# Patient Record
(demographics unavailable — no encounter records)

---

## 2024-10-07 NOTE — HISTORY OF PRESENT ILLNESS
[de-identified] : sore throat sore throat [FreeTextEntry6] : sore throat past 24 hours congested a bit sent home from school

## 2024-10-07 NOTE — PHYSICAL EXAM
[Clear Rhinorrhea] : clear rhinorrhea [Erythematous Oropharynx] : erythematous oropharynx [NL] : warm, clear [de-identified] : mild

## 2024-11-13 NOTE — HISTORY OF PRESENT ILLNESS
[de-identified] : sore throat [FreeTextEntry6] : sore throat  congested w cough chills last night has football game this coming weekend

## 2024-11-13 NOTE — DISCUSSION/SUMMARY
[FreeTextEntry1] : viral Rapid strep is negative. The TC has been sent out to the lab. We will call if overnight throat culture is positive and prescribe antibiotics. Meanwhile, I recommend rest and fluids. fever and pain control as needed. Re eval in office if fever persists more that 4-5 days or for any change or worsening symptoms. mom requested RVP-sent Use humidifier, saline nasal drops, encourage fluids and fever control as needed. Elevate head of bed. Return for spiking fever, worsening symptoms, respiratory distress or concerns.

## 2024-12-06 NOTE — DISCUSSION/SUMMARY
[FreeTextEntry1] : 10 year boy found to be rapid strep positive. Complete 10 days of antibiotics. Use antipyretics as needed. Return for follow up in 2 weeks. After being on antibiotics for atleast 24 hours patient less likely to spread infection.